# Patient Record
Sex: FEMALE | Race: WHITE | NOT HISPANIC OR LATINO
[De-identification: names, ages, dates, MRNs, and addresses within clinical notes are randomized per-mention and may not be internally consistent; named-entity substitution may affect disease eponyms.]

---

## 2017-02-01 ENCOUNTER — APPOINTMENT (OUTPATIENT)
Dept: PULMONOLOGY | Facility: CLINIC | Age: 67
End: 2017-02-01

## 2017-02-07 ENCOUNTER — APPOINTMENT (OUTPATIENT)
Dept: PULMONOLOGY | Facility: CLINIC | Age: 67
End: 2017-02-07

## 2017-02-07 VITALS
WEIGHT: 124 LBS | OXYGEN SATURATION: 96 % | DIASTOLIC BLOOD PRESSURE: 70 MMHG | BODY MASS INDEX: 26.03 KG/M2 | RESPIRATION RATE: 17 BRPM | HEIGHT: 58 IN | HEART RATE: 100 BPM | SYSTOLIC BLOOD PRESSURE: 110 MMHG

## 2017-02-13 ENCOUNTER — RX RENEWAL (OUTPATIENT)
Age: 67
End: 2017-02-13

## 2017-02-13 RX ORDER — OLOPATADINE HYDROCHLORIDE 665 UG/1
0.6 SPRAY, METERED NASAL
Qty: 3 | Refills: 1 | Status: DISCONTINUED | COMMUNITY
Start: 2017-02-07 | End: 2017-02-13

## 2017-02-13 RX ORDER — GLYCOPYRROLATE AND FORMOTEROL FUMARATE 9; 4.8 UG/1; UG/1
9-4.8 AEROSOL, METERED RESPIRATORY (INHALATION) TWICE DAILY
Qty: 1 | Refills: 5 | Status: DISCONTINUED | COMMUNITY
Start: 2017-02-07 | End: 2017-02-13

## 2017-02-27 ENCOUNTER — APPOINTMENT (OUTPATIENT)
Dept: CARDIOLOGY | Facility: CLINIC | Age: 67
End: 2017-02-27

## 2017-05-08 ENCOUNTER — APPOINTMENT (OUTPATIENT)
Dept: PULMONOLOGY | Facility: CLINIC | Age: 67
End: 2017-05-08

## 2017-05-25 ENCOUNTER — APPOINTMENT (OUTPATIENT)
Dept: PULMONOLOGY | Facility: CLINIC | Age: 67
End: 2017-05-25

## 2017-05-25 VITALS
DIASTOLIC BLOOD PRESSURE: 76 MMHG | RESPIRATION RATE: 14 BRPM | HEIGHT: 58 IN | HEART RATE: 80 BPM | BODY MASS INDEX: 26.03 KG/M2 | WEIGHT: 124 LBS | SYSTOLIC BLOOD PRESSURE: 120 MMHG | OXYGEN SATURATION: 100 %

## 2017-05-25 DIAGNOSIS — H57.8 OTHER SPECIFIED DISORDERS OF EYE AND ADNEXA: ICD-10-CM

## 2017-05-25 DIAGNOSIS — E03.9 HYPOTHYROIDISM, UNSPECIFIED: ICD-10-CM

## 2017-05-25 RX ORDER — GENTAMICIN SULFATE 1 MG/G
0.1 CREAM TOPICAL
Qty: 15 | Refills: 0 | Status: ACTIVE | COMMUNITY
Start: 2017-05-12

## 2017-05-30 ENCOUNTER — RX RENEWAL (OUTPATIENT)
Age: 67
End: 2017-05-30

## 2017-06-08 ENCOUNTER — RX RENEWAL (OUTPATIENT)
Age: 67
End: 2017-06-08

## 2017-07-07 ENCOUNTER — APPOINTMENT (OUTPATIENT)
Dept: NEUROLOGY | Facility: CLINIC | Age: 67
End: 2017-07-07

## 2017-07-10 ENCOUNTER — RX RENEWAL (OUTPATIENT)
Age: 67
End: 2017-07-10

## 2017-08-03 RX ORDER — INHALER, ASSIST DEVICES
SPACER (EA) MISCELLANEOUS
Qty: 1 | Refills: 0 | Status: ACTIVE | COMMUNITY
Start: 2017-08-03 | End: 1900-01-01

## 2017-08-26 ENCOUNTER — TRANSCRIPTION ENCOUNTER (OUTPATIENT)
Age: 67
End: 2017-08-26

## 2017-08-28 ENCOUNTER — RX RENEWAL (OUTPATIENT)
Age: 67
End: 2017-08-28

## 2017-09-25 ENCOUNTER — APPOINTMENT (OUTPATIENT)
Dept: PULMONOLOGY | Facility: CLINIC | Age: 67
End: 2017-09-25
Payer: MEDICARE

## 2017-09-25 VITALS
OXYGEN SATURATION: 97 % | SYSTOLIC BLOOD PRESSURE: 110 MMHG | DIASTOLIC BLOOD PRESSURE: 80 MMHG | BODY MASS INDEX: 26.03 KG/M2 | HEART RATE: 84 BPM | WEIGHT: 124 LBS | HEIGHT: 58 IN | RESPIRATION RATE: 17 BRPM

## 2017-09-25 PROCEDURE — 99214 OFFICE O/P EST MOD 30 MIN: CPT | Mod: 25

## 2017-09-25 PROCEDURE — 94010 BREATHING CAPACITY TEST: CPT

## 2017-09-25 RX ORDER — MONTELUKAST SODIUM 10 MG/1
10 TABLET, FILM COATED ORAL
Qty: 1 | Refills: 1 | Status: ACTIVE | COMMUNITY
Start: 2017-09-25 | End: 1900-01-01

## 2017-09-27 ENCOUNTER — TRANSCRIPTION ENCOUNTER (OUTPATIENT)
Age: 67
End: 2017-09-27

## 2017-10-26 ENCOUNTER — APPOINTMENT (OUTPATIENT)
Dept: CARDIOLOGY | Facility: CLINIC | Age: 67
End: 2017-10-26

## 2017-11-01 ENCOUNTER — FORM ENCOUNTER (OUTPATIENT)
Age: 67
End: 2017-11-01

## 2017-11-02 ENCOUNTER — OUTPATIENT (OUTPATIENT)
Dept: OUTPATIENT SERVICES | Facility: HOSPITAL | Age: 67
LOS: 1 days | End: 2017-11-02
Payer: MEDICARE

## 2017-11-02 DIAGNOSIS — R06.02 SHORTNESS OF BREATH: ICD-10-CM

## 2017-11-02 PROCEDURE — 76000 FLUOROSCOPY <1 HR PHYS/QHP: CPT

## 2017-11-02 PROCEDURE — 76000 FLUOROSCOPY <1 HR PHYS/QHP: CPT | Mod: 26

## 2017-11-07 ENCOUNTER — APPOINTMENT (OUTPATIENT)
Dept: PULMONOLOGY | Facility: CLINIC | Age: 67
End: 2017-11-07

## 2017-11-09 ENCOUNTER — APPOINTMENT (OUTPATIENT)
Dept: PULMONOLOGY | Facility: CLINIC | Age: 67
End: 2017-11-09
Payer: MEDICARE

## 2017-11-09 ENCOUNTER — MEDICATION RENEWAL (OUTPATIENT)
Age: 67
End: 2017-11-09

## 2017-11-09 VITALS
WEIGHT: 124 LBS | HEART RATE: 100 BPM | DIASTOLIC BLOOD PRESSURE: 80 MMHG | OXYGEN SATURATION: 95 % | SYSTOLIC BLOOD PRESSURE: 110 MMHG | HEIGHT: 58 IN | BODY MASS INDEX: 26.03 KG/M2

## 2017-11-09 DIAGNOSIS — R05 COUGH: ICD-10-CM

## 2017-11-09 PROCEDURE — 99214 OFFICE O/P EST MOD 30 MIN: CPT

## 2017-12-23 ENCOUNTER — RX RENEWAL (OUTPATIENT)
Age: 67
End: 2017-12-23

## 2017-12-26 ENCOUNTER — RX RENEWAL (OUTPATIENT)
Age: 67
End: 2017-12-26

## 2018-02-02 ENCOUNTER — APPOINTMENT (OUTPATIENT)
Dept: PULMONOLOGY | Facility: CLINIC | Age: 68
End: 2018-02-02
Payer: MEDICARE

## 2018-02-02 VITALS
DIASTOLIC BLOOD PRESSURE: 80 MMHG | RESPIRATION RATE: 17 BRPM | SYSTOLIC BLOOD PRESSURE: 112 MMHG | HEART RATE: 88 BPM | OXYGEN SATURATION: 98 %

## 2018-02-02 PROCEDURE — 94010 BREATHING CAPACITY TEST: CPT

## 2018-02-02 PROCEDURE — 99214 OFFICE O/P EST MOD 30 MIN: CPT | Mod: 25

## 2018-02-02 RX ORDER — CIPROFLOXACIN 3 MG/ML
0.3 SOLUTION OPHTHALMIC
Qty: 5 | Refills: 0 | Status: ACTIVE | COMMUNITY
Start: 2017-12-30

## 2018-02-02 RX ORDER — BUPRENORPHINE HYDROCHLORIDE 2 MG/1
2 TABLET SUBLINGUAL
Qty: 8 | Refills: 0 | Status: ACTIVE | COMMUNITY
Start: 2017-08-04

## 2018-02-14 ENCOUNTER — RX RENEWAL (OUTPATIENT)
Age: 68
End: 2018-02-14

## 2018-02-22 ENCOUNTER — APPOINTMENT (OUTPATIENT)
Dept: PULMONOLOGY | Facility: CLINIC | Age: 68
End: 2018-02-22
Payer: MEDICARE

## 2018-02-22 VITALS
RESPIRATION RATE: 16 BRPM | HEIGHT: 58 IN | DIASTOLIC BLOOD PRESSURE: 70 MMHG | BODY MASS INDEX: 25.82 KG/M2 | OXYGEN SATURATION: 95 % | WEIGHT: 123 LBS | SYSTOLIC BLOOD PRESSURE: 110 MMHG | HEART RATE: 95 BPM

## 2018-02-22 PROCEDURE — 99214 OFFICE O/P EST MOD 30 MIN: CPT

## 2018-04-09 ENCOUNTER — APPOINTMENT (OUTPATIENT)
Dept: PULMONOLOGY | Facility: CLINIC | Age: 68
End: 2018-04-09
Payer: MEDICARE

## 2018-04-09 ENCOUNTER — RX RENEWAL (OUTPATIENT)
Age: 68
End: 2018-04-09

## 2018-04-09 VITALS
HEIGHT: 58 IN | BODY MASS INDEX: 26.24 KG/M2 | HEART RATE: 96 BPM | SYSTOLIC BLOOD PRESSURE: 122 MMHG | DIASTOLIC BLOOD PRESSURE: 80 MMHG | OXYGEN SATURATION: 96 % | WEIGHT: 125 LBS

## 2018-04-09 DIAGNOSIS — J45.909 UNSPECIFIED ASTHMA, UNCOMPLICATED: ICD-10-CM

## 2018-04-09 DIAGNOSIS — R06.02 SHORTNESS OF BREATH: ICD-10-CM

## 2018-04-09 DIAGNOSIS — E55.9 VITAMIN D DEFICIENCY, UNSPECIFIED: ICD-10-CM

## 2018-04-09 DIAGNOSIS — K21.9 GASTRO-ESOPHAGEAL REFLUX DISEASE W/OUT ESOPHAGITIS: ICD-10-CM

## 2018-04-09 DIAGNOSIS — R06.83 SNORING: ICD-10-CM

## 2018-04-09 DIAGNOSIS — J30.9 ALLERGIC RHINITIS, UNSPECIFIED: ICD-10-CM

## 2018-04-09 DIAGNOSIS — E66.3 OVERWEIGHT: ICD-10-CM

## 2018-04-09 DIAGNOSIS — M94.0 CHONDROCOSTAL JUNCTION SYNDROME [TIETZE]: ICD-10-CM

## 2018-04-09 PROCEDURE — 94010 BREATHING CAPACITY TEST: CPT

## 2018-04-09 PROCEDURE — 99214 OFFICE O/P EST MOD 30 MIN: CPT | Mod: 25

## 2018-04-09 RX ORDER — CICLESONIDE 80 UG/1
80 AEROSOL, METERED RESPIRATORY (INHALATION) TWICE DAILY
Qty: 1 | Refills: 3 | Status: ACTIVE | COMMUNITY
Start: 2018-04-09 | End: 1900-01-01

## 2018-04-09 RX ORDER — TIOTROPIUM BROMIDE AND OLODATEROL 3.124; 2.736 UG/1; UG/1
2.5-2.5 SPRAY, METERED RESPIRATORY (INHALATION)
Qty: 1 | Refills: 2 | Status: DISCONTINUED | COMMUNITY
Start: 2017-02-13 | End: 2018-04-09

## 2018-04-09 RX ORDER — MOMETASONE FUROATE 1 MG/G
0.1 CREAM TOPICAL
Qty: 15 | Refills: 0 | Status: ACTIVE | COMMUNITY
Start: 2018-03-01

## 2018-04-09 RX ORDER — TIOTROPIUM BROMIDE AND OLODATEROL 3.124; 2.736 UG/1; UG/1
2.5-2.5 SPRAY, METERED RESPIRATORY (INHALATION) DAILY
Qty: 3 | Refills: 1 | Status: DISCONTINUED | COMMUNITY
Start: 2018-02-02 | End: 2018-04-09

## 2018-04-09 RX ORDER — BUDESONIDE 180 UG/1
180 AEROSOL, POWDER RESPIRATORY (INHALATION) TWICE DAILY
Qty: 3 | Refills: 1 | Status: ACTIVE | COMMUNITY
Start: 2018-04-09 | End: 1900-01-01

## 2018-04-09 RX ORDER — BECLOMETHASONE DIPROPIONATE 80 UG/1
80 AEROSOL, METERED RESPIRATORY (INHALATION) TWICE DAILY
Qty: 1 | Refills: 0 | Status: DISCONTINUED | COMMUNITY
Start: 2017-09-27 | End: 2018-04-09

## 2018-06-04 ENCOUNTER — APPOINTMENT (OUTPATIENT)
Dept: PULMONOLOGY | Facility: CLINIC | Age: 68
End: 2018-06-04

## 2018-06-07 ENCOUNTER — APPOINTMENT (OUTPATIENT)
Dept: ORTHOPEDIC SURGERY | Facility: CLINIC | Age: 68
End: 2018-06-07

## 2018-06-19 ENCOUNTER — RX RENEWAL (OUTPATIENT)
Age: 68
End: 2018-06-19

## 2018-07-26 ENCOUNTER — RX RENEWAL (OUTPATIENT)
Age: 68
End: 2018-07-26

## 2018-09-25 ENCOUNTER — RX RENEWAL (OUTPATIENT)
Age: 68
End: 2018-09-25

## 2018-09-27 ENCOUNTER — RX RENEWAL (OUTPATIENT)
Age: 68
End: 2018-09-27

## 2018-10-15 ENCOUNTER — RX RENEWAL (OUTPATIENT)
Age: 68
End: 2018-10-15

## 2018-11-28 ENCOUNTER — TRANSCRIPTION ENCOUNTER (OUTPATIENT)
Age: 68
End: 2018-11-28

## 2018-12-07 ENCOUNTER — APPOINTMENT (OUTPATIENT)
Dept: CT IMAGING | Facility: CLINIC | Age: 68
End: 2018-12-07

## 2019-01-30 ENCOUNTER — RX RENEWAL (OUTPATIENT)
Age: 69
End: 2019-01-30

## 2019-03-31 ENCOUNTER — RX RENEWAL (OUTPATIENT)
Age: 69
End: 2019-03-31

## 2019-04-01 ENCOUNTER — RX RENEWAL (OUTPATIENT)
Age: 69
End: 2019-04-01

## 2019-04-27 ENCOUNTER — RX RENEWAL (OUTPATIENT)
Age: 69
End: 2019-04-27

## 2019-07-02 ENCOUNTER — APPOINTMENT (OUTPATIENT)
Dept: OTOLARYNGOLOGY | Facility: CLINIC | Age: 69
End: 2019-07-02

## 2020-02-16 ENCOUNTER — TRANSCRIPTION ENCOUNTER (OUTPATIENT)
Age: 70
End: 2020-02-16

## 2020-09-03 ENCOUNTER — TRANSCRIPTION ENCOUNTER (OUTPATIENT)
Age: 70
End: 2020-09-03

## 2021-06-15 ENCOUNTER — TRANSCRIPTION ENCOUNTER (OUTPATIENT)
Age: 71
End: 2021-06-15

## 2021-06-25 ENCOUNTER — APPOINTMENT (OUTPATIENT)
Dept: MAMMOGRAPHY | Facility: CLINIC | Age: 71
End: 2021-06-25

## 2021-06-25 ENCOUNTER — APPOINTMENT (OUTPATIENT)
Dept: ULTRASOUND IMAGING | Facility: CLINIC | Age: 71
End: 2021-06-25

## 2021-10-21 ENCOUNTER — APPOINTMENT (OUTPATIENT)
Dept: MAMMOGRAPHY | Facility: CLINIC | Age: 71
End: 2021-10-21
Payer: COMMERCIAL

## 2021-10-21 ENCOUNTER — APPOINTMENT (OUTPATIENT)
Dept: ULTRASOUND IMAGING | Facility: CLINIC | Age: 71
End: 2021-10-21
Payer: COMMERCIAL

## 2021-10-21 PROCEDURE — 77067 SCR MAMMO BI INCL CAD: CPT

## 2021-10-21 PROCEDURE — 77063 BREAST TOMOSYNTHESIS BI: CPT

## 2021-10-21 PROCEDURE — 76641 ULTRASOUND BREAST COMPLETE: CPT | Mod: 50

## 2021-12-28 ENCOUNTER — APPOINTMENT (OUTPATIENT)
Dept: ORTHOPEDIC SURGERY | Facility: CLINIC | Age: 71
End: 2021-12-28

## 2022-04-25 ENCOUNTER — TRANSCRIPTION ENCOUNTER (OUTPATIENT)
Age: 72
End: 2022-04-25

## 2022-05-03 ENCOUNTER — APPOINTMENT (OUTPATIENT)
Dept: ORTHOPEDIC SURGERY | Facility: CLINIC | Age: 72
End: 2022-05-03

## 2022-06-04 ENCOUNTER — NON-APPOINTMENT (OUTPATIENT)
Age: 72
End: 2022-06-04

## 2022-06-23 ENCOUNTER — APPOINTMENT (OUTPATIENT)
Dept: ULTRASOUND IMAGING | Facility: CLINIC | Age: 72
End: 2022-06-23

## 2022-07-11 ENCOUNTER — APPOINTMENT (OUTPATIENT)
Dept: ULTRASOUND IMAGING | Facility: CLINIC | Age: 72
End: 2022-07-11

## 2022-07-21 ENCOUNTER — APPOINTMENT (OUTPATIENT)
Dept: ULTRASOUND IMAGING | Facility: CLINIC | Age: 72
End: 2022-07-21

## 2022-08-11 ENCOUNTER — APPOINTMENT (OUTPATIENT)
Dept: ORTHOPEDIC SURGERY | Facility: CLINIC | Age: 72
End: 2022-08-11

## 2022-09-01 ENCOUNTER — APPOINTMENT (OUTPATIENT)
Dept: ORTHOPEDIC SURGERY | Facility: CLINIC | Age: 72
End: 2022-09-01

## 2022-09-01 DIAGNOSIS — M72.0 PALMAR FASCIAL FIBROMATOSIS [DUPUYTREN]: ICD-10-CM

## 2022-09-01 PROCEDURE — 99204 OFFICE O/P NEW MOD 45 MIN: CPT | Mod: 57

## 2022-09-01 PROCEDURE — 26040 RELEASE PALM CONTRACTURE: CPT | Mod: LT

## 2022-11-26 ENCOUNTER — NON-APPOINTMENT (OUTPATIENT)
Age: 72
End: 2022-11-26

## 2023-01-24 ENCOUNTER — TRANSCRIPTION ENCOUNTER (OUTPATIENT)
Age: 73
End: 2023-01-24

## 2023-01-25 ENCOUNTER — EMERGENCY (EMERGENCY)
Facility: HOSPITAL | Age: 73
LOS: 1 days | Discharge: HOME | End: 2023-01-25
Attending: EMERGENCY MEDICINE
Payer: COMMERCIAL

## 2023-01-25 VITALS
DIASTOLIC BLOOD PRESSURE: 74 MMHG | WEIGHT: 149.91 LBS | HEIGHT: 64 IN | TEMPERATURE: 98 F | OXYGEN SATURATION: 99 % | SYSTOLIC BLOOD PRESSURE: 107 MMHG | RESPIRATION RATE: 18 BRPM | HEART RATE: 78 BPM

## 2023-01-25 VITALS
DIASTOLIC BLOOD PRESSURE: 81 MMHG | TEMPERATURE: 98 F | OXYGEN SATURATION: 98 % | HEART RATE: 65 BPM | SYSTOLIC BLOOD PRESSURE: 130 MMHG | RESPIRATION RATE: 18 BRPM

## 2023-01-25 PROCEDURE — 90715 TDAP VACCINE 7 YRS/> IM: CPT

## 2023-01-25 PROCEDURE — 99285 EMERGENCY DEPT VISIT HI MDM: CPT | Mod: 25

## 2023-01-25 PROCEDURE — 13151 CMPLX RPR E/N/E/L 1.1-2.5 CM: CPT

## 2023-01-25 PROCEDURE — 13132 CMPLX RPR F/C/C/M/N/AX/G/H/F: CPT

## 2023-01-25 PROCEDURE — 99284 EMERGENCY DEPT VISIT MOD MDM: CPT

## 2023-01-25 RX ORDER — ACETAMINOPHEN 500 MG
975 TABLET ORAL ONCE
Refills: 0 | Status: COMPLETED | OUTPATIENT
Start: 2023-01-25 | End: 2023-01-25

## 2023-01-25 RX ORDER — TETANUS TOXOID, REDUCED DIPHTHERIA TOXOID AND ACELLULAR PERTUSSIS VACCINE, ADSORBED 5; 2.5; 8; 8; 2.5 [IU]/.5ML; [IU]/.5ML; UG/.5ML; UG/.5ML; UG/.5ML
0.5 SUSPENSION INTRAMUSCULAR ONCE
Refills: 0 | Status: COMPLETED | OUTPATIENT
Start: 2023-01-25 | End: 2023-01-25

## 2023-01-25 RX ADMIN — TETANUS TOXOID, REDUCED DIPHTHERIA TOXOID AND ACELLULAR PERTUSSIS VACCINE, ADSORBED 0.5 MILLILITER(S): 5; 2.5; 8; 8; 2.5 SUSPENSION INTRAMUSCULAR at 17:39

## 2023-01-25 RX ADMIN — Medication 975 MILLIGRAM(S): at 17:39

## 2023-01-25 NOTE — ED ADULT TRIAGE NOTE - ESI TRIAGE ACUITY LEVEL, MLM
4 Total Volume Injected (Ccs- Only Use Numbers And Decimals): 0.5 Administered By (Optional): Dr. Joleen park Include Z78.9 (Other Specified Conditions Influencing Health Status) As An Associated Diagnosis?: No Consent: The risks of atrophy were reviewed with the patient. Concentration Of Solution Injected (Mg/Ml): 2.5 Kenalog Preparation: Kenalog Medical Necessity Clause: This procedure was medically necessary because the lesions that were treated were: Detail Level: Detailed Validate Note Data When Using Inventory: Yes X Size Of Lesion In Cm (Optional): 0

## 2023-01-25 NOTE — ED PROVIDER NOTE - NS ED ATTENDING STATEMENT MOD
This was a shared visit with the LORRAINE. I reviewed and verified the documentation and independently performed the documented:

## 2023-01-25 NOTE — ED ADULT NURSE NOTE - NSIMPLEMENTINTERV_GEN_ALL_ED
Implemented All Universal Safety Interventions:  [place] to call system. Call bell, personal items and telephone within reach. Instruct patient to call for assistance. Room bathroom lighting operational. Non-slip footwear when patient is off stretcher. Physically safe environment: no spills, clutter or unnecessary equipment. Stretcher in lowest position, wheels locked, appropriate side rails in place.

## 2023-01-25 NOTE — ED PROVIDER NOTE - PHYSICAL EXAMINATION
Attending note.  Patient is alert and in no acute distress.  There is a 4 cm laceration in the central lower part of the forehead.  There is also an abrasion and small laceration over the bridge of the nose.  There is no nasal tenderness or signs of previous epistaxis.  Remainder of face is nontender.  TMJs nontender.  Neck and spine are nontender.  Chest/ribs have slight tenderness in the anterior chest which is generalized and not localized.  Lungs are clear and equal bilaterally without splinting.  Heart is regular rate and rhythm.  Abdomen is soft and nontender.  Pelvis and hips are nontender.  Patient is moving all extremities without pain or tenderness.  Neurologic examination is grossly intact and nonfocal.  Speech is clear and fluent.  Patient is moving without apprehension.

## 2023-01-25 NOTE — ED PROVIDER NOTE - MUSCULOSKELETAL MINIMAL EXAM
+anterior chest wall ttp across upper chest; no overlying erythema/ecchymosis +anterior chest wall ttp across upper chest; no overlying erythema/ecchymosis; TMJ WNL

## 2023-01-25 NOTE — ED PROVIDER NOTE - PATIENT PORTAL LINK FT
You can access the FollowMyHealth Patient Portal offered by [facility] by registering at the following website: [email or web address]. By joining [employer]’s FollowMyHealth portal, you will also be able to view your health information using other applications (apps) compatible with our system.

## 2023-01-25 NOTE — ED ADULT TRIAGE NOTE - CHIEF COMPLAINT QUOTE
as per EMS, pt "was coming out of physical therapy and became unsteady and a piece of equipment fell on her head" -LOC

## 2023-01-25 NOTE — ED PROVIDER NOTE - CARE PROVIDER_API CALL
Sea Lara (MD)  Plastic Surgery; Surgery of the Hand  [address], [address]  [place] [address]  Phone: [phone]  Fax: [phone]  Follow Up Time: 7-10 Days

## 2023-01-25 NOTE — ED PROVIDER NOTE - CLINICAL SUMMARY MEDICAL DECISION MAKING FREE TEXT BOX
Attending note.  Facial trauma with laceration to forehead and bridge of nose.  Patient is requesting plastic surgeon for repair.  Patient is declining CT scan as to concern with radiation.  Patient is not take any anticoagulation or antiplatelet medication and has no CNS complaints.  Tetanus prophylaxis.  Plastic surgery consultation.  Analgesia.

## 2023-01-25 NOTE — ED PROVIDER NOTE - PROGRESS NOTE DETAILS
Msg left for plastics on call Dr. Lara. - Merissa Vizcaino, PA-C Spoke with Dr. Lara, will see patient. - Merissa Vizcaino PA-C pt initially amenable to xray, now declining, xrays cancelled. - Merissa Vizcaino PA-C Lac repaired by Dr. Lara, advised pt to f/u in 10-14 days. Bacitracin applied to wounds. - Merissa Vizcaino PA-C

## 2023-01-25 NOTE — ED PROVIDER NOTE - OBJECTIVE STATEMENT
73yo F with PMH of asthma BIBEMS s/p fall/injury at physical therapy office prior to arrival c/o facial injuries. Pt reports she finished on a machine at PT, lost her balance when standing up and grabbed onto the heavy machine which then fell on top of her. Pt sustained forehead laceration and lac to nose, + bleeding. Also reports anterior chest soreness. Reports she does not think she passed out, staff at office witnessed the fall, and pt was helped up right after. Has not taken anything for pain. Tetanus is not UTD. Denies any dizziness/CP/palpitations prior to fall. Denies current HA, dizziness, vision changes, epistaxis, SOB, n/v, abdominal pain, neck pain, back pain, any other injury. Pt declining and CT scans, reporting she just had one recently and does not want the radiation. 73yo F with PMH of asthma BIBEMS s/p fall/injury at physical therapy office prior to arrival c/o facial injuries. Pt reports she finished on a machine at PT, lost her balance when standing up and grabbed onto the heavy machine which then fell on top of her. Pt sustained forehead laceration and lac to nose, + bleeding. Also reports anterior chest soreness. Reports she does not think she passed out, staff at office witnessed the fall, and pt was helped up right after. Has not taken anything for pain. Tetanus is not UTD. Denies any dizziness/CP/palpitations prior to fall. Denies current HA, dizziness, vision changes, epistaxis, SOB, n/v, abdominal pain, neck pain, back pain, any other injury, blood thinner use. Pt declining and CT scan, reporting she just had one recently and does not want the radiation. Requesting plastics for lac repair. 73yo F with PMH of asthma BIBEMS s/p fall/injury at physical therapy office prior to arrival c/o facial injuries. Pt reports she finished on a machine at PT, lost her balance when standing up and grabbed onto the heavy machine which then fell on top of her. Pt sustained forehead laceration and lac to nose, + bleeding. Also reports anterior chest soreness. Reports she does not think she passed out, staff at office witnessed the fall, and pt was helped up right after. Has not taken anything for pain. Tetanus is not UTD. Denies any dizziness/CP/palpitations prior to fall. Denies current HA, dizziness, vision changes, epistaxis, SOB, n/v, abdominal pain, neck pain, back pain, any other injury, blood thinner use. Pt declining and CT scan, reporting she just had one recently and does not want the radiation. Requesting plastics for lac repair.        Attending note.  Patient was seen in room #33 to the right.  Agree with above.  Patient was at rehab and felt dizzy when standing up after performing an exercise.  She grabbed another machine and folded over on top of her striking her in the face.  Patient sustained laceration across the central forehead and across the bridge of the nose.  She denies any headache, dizziness, nausea, and vomiting.  She denies any neck pain or back pain.  She reports some soreness in the anterior chest.  She denies any difficulty breathing.  She denies any abdominal pain.  She denies any injury to the extremities.  She has a history of falling hence why she is going to rehab.  She is not currently taking any anticoagulation or antiplatelet medication.  Tetanus is not up-to-date.

## 2023-01-25 NOTE — ED PROVIDER NOTE - NSFOLLOWUPINSTRUCTIONS_ED_ALL_ED_FT
Rest. Stay well hydrated.  Limit television/computer/phone screen time.   Avoid alcohol and caffeine intake.  Eat small frequent meals.     Keep sutures clean and dry for 24 hours, then may clean with soap and water daily.    Apply bacitracin and cover to keep wound clean.     Take Tylenol 650mg every 6 hours as needed for pain.     Follow up with your Primary Care Provider upon discharge.     Please follow up with Plastic Surgeon, Dr. Lara, in office in 10-14 days as instructed.     Return to ER for any increased pain, redness, streaking (red lines), swelling, fever/chills, vomiting, dizziness, weakness, changes in vision, numbness/tingling, unable to eat/drink, trouble walking, or any other concerning symptoms.    Please read information on head injury and laceration care provided.

## 2023-05-17 NOTE — ED ADULT TRIAGE NOTE - NS ED NURSE BANDS TYPE
Is This A New Presentation, Or A Follow-Up?: Skin Lesions
How Severe Is Your Skin Lesion?: mild
Have Your Skin Lesions Been Treated?: not been treated
Name band;

## 2023-05-19 ENCOUNTER — APPOINTMENT (OUTPATIENT)
Dept: ULTRASOUND IMAGING | Facility: CLINIC | Age: 73
End: 2023-05-19
Payer: COMMERCIAL

## 2023-05-19 PROCEDURE — 76700 US EXAM ABDOM COMPLETE: CPT

## 2023-05-24 ENCOUNTER — APPOINTMENT (OUTPATIENT)
Dept: MAMMOGRAPHY | Facility: CLINIC | Age: 73
End: 2023-05-24
Payer: COMMERCIAL

## 2023-05-24 ENCOUNTER — APPOINTMENT (OUTPATIENT)
Dept: ULTRASOUND IMAGING | Facility: CLINIC | Age: 73
End: 2023-05-24
Payer: COMMERCIAL

## 2023-05-24 PROCEDURE — 76641 ULTRASOUND BREAST COMPLETE: CPT | Mod: 50

## 2023-05-24 PROCEDURE — 77063 BREAST TOMOSYNTHESIS BI: CPT

## 2023-05-24 PROCEDURE — 77067 SCR MAMMO BI INCL CAD: CPT

## 2023-12-21 ENCOUNTER — APPOINTMENT (OUTPATIENT)
Dept: ULTRASOUND IMAGING | Facility: CLINIC | Age: 73
End: 2023-12-21
Payer: COMMERCIAL

## 2023-12-21 PROCEDURE — 76700 US EXAM ABDOM COMPLETE: CPT

## 2024-02-27 ENCOUNTER — APPOINTMENT (OUTPATIENT)
Dept: RADIOLOGY | Facility: CLINIC | Age: 74
End: 2024-02-27
Payer: COMMERCIAL

## 2024-02-27 PROCEDURE — 77080 DXA BONE DENSITY AXIAL: CPT

## 2024-06-12 ENCOUNTER — APPOINTMENT (OUTPATIENT)
Dept: PSYCHIATRY | Facility: CLINIC | Age: 74
End: 2024-06-12
Payer: COMMERCIAL

## 2024-06-12 DIAGNOSIS — R63.8 OTHER SYMPTOMS AND SIGNS CONCERNING FOOD AND FLUID INTAKE: ICD-10-CM

## 2024-06-12 DIAGNOSIS — H40.9 UNSPECIFIED GLAUCOMA: ICD-10-CM

## 2024-06-12 DIAGNOSIS — F31.9 BIPOLAR DISORDER, UNSPECIFIED: ICD-10-CM

## 2024-06-12 DIAGNOSIS — G24.4 IDIOPATHIC OROFACIAL DYSTONIA: ICD-10-CM

## 2024-06-12 DIAGNOSIS — K21.9 GASTRO-ESOPHAGEAL REFLUX DISEASE W/OUT ESOPHAGITIS: ICD-10-CM

## 2024-06-12 PROCEDURE — 99205 OFFICE O/P NEW HI 60 MIN: CPT

## 2024-06-12 RX ORDER — MONTELUKAST 10 MG/1
10 TABLET, FILM COATED ORAL
Qty: 90 | Refills: 1 | Status: DISCONTINUED | COMMUNITY
Start: 2017-12-23 | End: 2024-06-12

## 2024-06-12 RX ORDER — BUDESONIDE, GLYCOPYRROLATE, AND FORMOTEROL FUMARATE 160; 9; 4.8 UG/1; UG/1; UG/1
160-9-4.8 AEROSOL, METERED RESPIRATORY (INHALATION)
Refills: 0 | Status: ACTIVE | COMMUNITY
Start: 2024-06-12

## 2024-06-12 RX ORDER — GABAPENTIN 300 MG/1
300 CAPSULE ORAL
Qty: 270 | Refills: 0 | Status: ACTIVE | COMMUNITY
Start: 2024-06-12

## 2024-06-12 RX ORDER — PANTOPRAZOLE 20 MG/1
20 TABLET, DELAYED RELEASE ORAL DAILY
Refills: 0 | Status: ACTIVE | COMMUNITY
Start: 2024-06-12

## 2024-06-12 RX ORDER — SERTRALINE 25 MG/1
25 TABLET, FILM COATED ORAL DAILY
Qty: 90 | Refills: 0 | Status: ACTIVE | COMMUNITY
Start: 2024-06-12

## 2024-06-12 RX ORDER — AZELASTINE HYDROCHLORIDE 205.5 UG/1
0.15 SPRAY, METERED NASAL TWICE DAILY
Qty: 3 | Refills: 2 | Status: DISCONTINUED | COMMUNITY
Start: 2017-02-13 | End: 2024-06-12

## 2024-06-12 RX ORDER — SUCRALFATE 1 G/10ML
1 SUSPENSION ORAL
Qty: 300 | Refills: 0 | Status: DISCONTINUED | COMMUNITY
Start: 2017-08-26 | End: 2024-06-12

## 2024-06-12 RX ORDER — SEMAGLUTIDE 1.7 MG/.75ML
1.7 INJECTION, SOLUTION SUBCUTANEOUS
Refills: 0 | Status: ACTIVE | COMMUNITY
Start: 2024-06-12

## 2024-06-12 RX ORDER — GLYCOPYRROLATE AND FORMOTEROL FUMARATE 9; 4.8 UG/1; UG/1
9-4.8 AEROSOL, METERED RESPIRATORY (INHALATION)
Qty: 3 | Refills: 1 | Status: DISCONTINUED | COMMUNITY
Start: 2017-06-08 | End: 2024-06-12

## 2024-06-12 RX ORDER — SERTRALINE HYDROCHLORIDE 100 MG/1
100 TABLET, FILM COATED ORAL DAILY
Qty: 90 | Refills: 0 | Status: ACTIVE | COMMUNITY
Start: 2024-06-12

## 2024-06-12 RX ORDER — LATANOPROST/PF 0.005 %
0.01 DROPS OPHTHALMIC (EYE) DAILY
Refills: 0 | Status: ACTIVE | COMMUNITY
Start: 2024-06-12

## 2024-06-12 NOTE — RISK ASSESSMENT
[Clinical Interview] : Clinical Interview [None in the patient's lifetime] : None in the patient's lifetime [None Known] : none known [No known risk factors] : No known risk factors [Residential stability] : residential stability [Relationship stability] : relationship stability [Insight into violence risk and need for management/treatment] : insight into violence risk and need for management/treatment [Engagement in treatment] : engagement in treatment [Yes] : yes [No] : No [(1) Less than once a week] : Frequency: How many times have you had these thoughts? Less than once a week [(1) Fleeting - few seconds/minutes] : Fleeting - a few seconds or minutes [(1) Easily able to control thoughts] : Easily able to control thoughts [(0) Does not apply] : Does not apply [(5) Completely to end or stop the pain (you could't go on living with the pain or how you were feeling)] : Completely to end or stop the pain (you couldn't go on living with the pain or how you were feeling) [Mood disorder] : mood disorder [Change in provider or treatment (i.e., medications, psychotherapy, milieu)] : change in provider or treatment (i.e., medications, psychotherapy, milieu) [Other: ___] : [unfilled] [Identifies reasons for living] : identifies reasons for living [Supportive social network of family or friends] : supportive social network of family or friends [Positive therapeutic relationships] : positive therapeutic relationships [Responsibility to children, family, or others] : responsibility to children, family, or others [Frustration tolerance] : frustration tolerance [FreeTextEntry1] : " It's so vague." Per pt she denied any intentions of self harm.  Would want relief but actually denied ideation.  [TextBox_32] : Has had intermittent ideation, pt has been hospitalized, more than a decade ago, and was hospitalized 4 times. (due to nola).  [FreeTextEntry2] : 6 [FreeTextEntry3] : brain tumor of daughter with concerning prognosis.  [FreeTextEntry5] : Pt states spouse and grandchildren are a protective factor as well as psychology relationship.  [FreeTextEntry6] : Pt perceives herself as safe.  [de-identified] : no access to weapons.

## 2024-06-12 NOTE — REVIEW OF SYSTEMS
[Negative] : Heme/Lymph [de-identified] : poor balance and finds assistive devices worse.  [de-identified] : see interval history.  [FreeTextEntry1] : seasonal allergies and headache

## 2024-06-12 NOTE — PAST MEDICAL HISTORY
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  Pt does not want to take a chance on having any TD with an SGA.  Pt states she did take Quetiapine, but cannot recall the issue.   Pt reports she went to PT was unsupervised in a session, grabbed on to a machine which subsequently fell on her, resulting in 7 stitches to her forehead.

## 2024-06-12 NOTE — REASON FOR VISIT
[Private Practice - Therapist/Psychologist] : Private Practice - Therapist/Psychologist [Non-[facility] Health Provider/Facility] : Non-[facility] Health Provider/Facility [Patient] : Patient [FreeTextEntry5] : English [FreeTextEntry2] : Pt reports that her psychiatrist Dr. Lundberg retired.  [FreeTextEntry1] : " I've always dealt with depression."

## 2024-06-12 NOTE — ACTIVE PROBLEMS
[FreeTextEntry1] : GERD, Asthma, Glaucoma, Hypothyroidism.  (pt states she is not currently on Synthroid).   Pt reports she does NOT have an allergy to PCN.  She had gone to an allergist and been tested.  Pt has seasonal allergies.

## 2024-06-12 NOTE — PHYSICAL EXAM
[Walk Is Unsteady] : walk is unsteady [FreeTextEntry2] : Pt c/o poor balance.  [FreeTextEntry5] : Pt reports she has tongue movements.  Appreciate remote neurology notes in [facility] data base. Previous pulmonary notes as well.  [Well groomed] : well groomed [Cooperative] : cooperative [Anxious] : anxious [Full] : full [Clear] : clear [Linear/Goal Directed] : linear/goal directed [None] : none [None Reported] : none reported [Average] : average [WNL] : within normal limits [FreeTextEntry1] : Pt is 4'9", casually dressed.  [de-identified] : Pt however is hard of hearing and forgot to wear her hearing aids.  [FreeTextEntry7] : worried about her daughter understandably.  [de-identified] : poor historian for time line.

## 2024-06-12 NOTE — HISTORY OF PRESENT ILLNESS
[FreeTextEntry1] : Pt reports her daughter has a brain tumor, but as per pt she has been able to survive that, but currently prognosis is guarded.  Pt daughter has had a recent surgery, she has struggled with this for 15 years, (an ogliodendrocytoma).  Pt daughter recently had a seizure, and she is 48 years old, pt psychiatrist is retiring and her psychotherapist Dr. Castellon referred her.  Pt reports she had fallen in her home a few years ago, she had LOC, she was given a diagnosis of cerebral ataxia, and her ambulation is also problematic, and her walking is worse recently, she has gotten fearful of walking and so stays home a lot. Sleep is poor, mostly difficulty falling asleep, and has nights she does not sleep at all. Appetite, is ok, energy is poor, mood "not so hot", decreased memory and concentration that she attributes to age, and reports some disorganization, per pt her mother had dementia.  [FreeTextEntry2] : Pt reports she will feel suicidal when manic, and with nola will have more irritability than euphoria.  Pt has been hospitalized 4 times and finds the depression harder. 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  Pt has had ECT, has done Ketamine (which flipped her into nola). Last ECT was 10 years ago, inpatient at [facility]. Pt has mainly been hospitalized in [place]. Pt reports depression "very young", when she went to college " had a nervous breakdown".  Pt reports raising her children the "happiest time of her life".  Mainly Dr. Lundberg in the last 15 years and seeing Dr. Gama Castellon in the past decade or so as well.  [FreeTextEntry3] : Lithium difficult to tolerate due to GI side effects.  AED's Depakote (not helpful), Lamotrigine, Gabapentin.  Elavil, Nardil, Pamelor, Prozac, Sertraline, Celexa, Lexapro, Venlafaxine, Duloxetine, Wellbutrin.  Olanzapine -pt states it was helpful, but it affected her tongue (described as going to the roof of her mouth)- Pt fearful that she will get TD.  Ativan Klonopin Restoril Melatonin Quetiapine  ?

## 2024-06-12 NOTE — PLAN
[Every ___ week(s)] : Medication Management: Every [unfilled] week(s) [FreeTextEntry4] : Pt wants to taper off Klonopin attempted to stop Klonopin in December of 2023, was very agitated.  Pt had gotten down to 1 mg, but then returned to her previous dose.  Refilled all medication with exception of controlled substance, not due for refill yet, I stop checked.  KAITLYN filled out for her previous provider and psychologist for collateral information.

## 2024-06-12 NOTE — DISCUSSION/SUMMARY
[Low acute suicide risk] : Low acute suicide risk [No] : No [FreeTextEntry1] : Pt denied current suicidal ideation has had it intermittently in the context of a mood episode ( nola).

## 2024-06-12 NOTE — PSYCHOSOCIAL ASSESSMENT
[Other: _____] : [unfilled] [Retired] : retired [Financially stable] : financially stable [Client's spouse or domestic partner] : client's spouse or domestic partner [No] : No [FreeTextEntry2] : Resides with her spouse.  [FreeTextEntry3] : Pt states she could not function without him,  for 53 years.  Pt is [place] is not observant.  [had nightmares about the event(s) or thought about the event(s) when you did not want] : did not have nightmares and/or unwanted thoughts about the events [tried hard not to think about the event(s) or went out of your way to avoid situations that reminded you of the event] : did not need to avoid thinking about events, did not need to avoid situations that might remind patient of events [has been constantly on guard, watchful, or easily startled] : has not been constantly on guard, watchful, or easily startled [felt numb or detached from people, activities, or your surrounding] : has not felt numb or detached from people, activities, or surroundings [felt guilty or unable to stop blaming yourself or others for the event(s) or any problems the event(s) may have caused] : has not felt guilty or unable to stop blaming self or others for event(s), or any problems the event(s) may have caused [FreeTextEntry1] : Pt was a  at [employer] and at a local [employer].  Last employed a decade ago.  [FreeTextEntry4] : resides with her spouse [FreeTextEntry7] : Daughter resides in [facility] ( oldest dtr) [FreeTextEntry8] : Daughter resides in [facility]  [de-identified] : has 3 grandchildren

## 2024-07-02 ENCOUNTER — NON-APPOINTMENT (OUTPATIENT)
Age: 74
End: 2024-07-02

## 2024-07-18 ENCOUNTER — NON-APPOINTMENT (OUTPATIENT)
Age: 74
End: 2024-07-18

## 2024-08-26 ENCOUNTER — NON-APPOINTMENT (OUTPATIENT)
Age: 74
End: 2024-08-26

## 2024-08-29 ENCOUNTER — APPOINTMENT (OUTPATIENT)
Dept: PSYCHIATRY | Facility: CLINIC | Age: 74
End: 2024-08-29

## 2024-09-24 ENCOUNTER — RX RENEWAL (OUTPATIENT)
Age: 74
End: 2024-09-24

## 2024-10-21 ENCOUNTER — APPOINTMENT (OUTPATIENT)
Dept: PSYCHIATRY | Facility: CLINIC | Age: 74
End: 2024-10-21

## 2025-01-27 NOTE — ED ADULT NURSE NOTE - OBJECTIVE STATEMENT
January 28, 2025     Patient: Mirtha Overton   YOB: 1961       To Whom it May Concern:    Mirtha Overton is under my care. She has been advised to take Calcium and Vitamin D Supplements.     If you have any questions or concerns, please don't hesitate to call.      Sincerely,         An Powell, DO      Medical information is confidential and cannot be disclosed without the written consent of the patient or her representative.    CC: No Recipients     received pt in assigned area a&Xo3 pt states she was at [facility] & when walking she held on a piece of equipment & it fell directly on her face, denies LOC or taking anticoagulants, laceration noted across forehead & small lac noted to bridge of nose with swelling, bleeding currently controlled, denies any h/a, dizziness, n/v or blurry vision, resps even and non bryce alves MD at bedside for eval of pt

## 2025-04-03 ENCOUNTER — NON-APPOINTMENT (OUTPATIENT)
Age: 75
End: 2025-04-03

## 2025-06-30 ENCOUNTER — APPOINTMENT (OUTPATIENT)
Dept: ULTRASOUND IMAGING | Facility: CLINIC | Age: 75
End: 2025-06-30
Payer: COMMERCIAL

## 2025-06-30 ENCOUNTER — APPOINTMENT (OUTPATIENT)
Dept: MAMMOGRAPHY | Facility: CLINIC | Age: 75
End: 2025-06-30
Payer: COMMERCIAL

## 2025-06-30 PROCEDURE — 77063 BREAST TOMOSYNTHESIS BI: CPT

## 2025-06-30 PROCEDURE — 77067 SCR MAMMO BI INCL CAD: CPT

## 2025-06-30 PROCEDURE — 76641 ULTRASOUND BREAST COMPLETE: CPT | Mod: 50
